# Patient Record
Sex: MALE | Race: WHITE | NOT HISPANIC OR LATINO | URBAN - METROPOLITAN AREA
[De-identification: names, ages, dates, MRNs, and addresses within clinical notes are randomized per-mention and may not be internally consistent; named-entity substitution may affect disease eponyms.]

---

## 2022-07-15 ENCOUNTER — OUTPATIENT (OUTPATIENT)
Dept: OUTPATIENT SERVICES | Facility: HOSPITAL | Age: 62
LOS: 1 days | End: 2022-07-15
Payer: MEDICARE

## 2022-07-15 DIAGNOSIS — Z01.818 ENCOUNTER FOR OTHER PREPROCEDURAL EXAMINATION: ICD-10-CM

## 2022-07-15 LAB
A1C WITH ESTIMATED AVERAGE GLUCOSE RESULT: 6.6 % — HIGH (ref 4–5.6)
ANION GAP SERPL CALC-SCNC: 15 MMOL/L — SIGNIFICANT CHANGE UP (ref 5–17)
APTT BLD: 27.4 SEC — LOW (ref 27.5–35.5)
BASOPHILS # BLD AUTO: 0.05 K/UL — SIGNIFICANT CHANGE UP (ref 0–0.2)
BASOPHILS NFR BLD AUTO: 0.5 % — SIGNIFICANT CHANGE UP (ref 0–2)
BLD GP AB SCN SERPL QL: SIGNIFICANT CHANGE UP
BUN SERPL-MCNC: 11.8 MG/DL — SIGNIFICANT CHANGE UP (ref 8–20)
CALCIUM SERPL-MCNC: 9.6 MG/DL — SIGNIFICANT CHANGE UP (ref 8.6–10.2)
CHLORIDE SERPL-SCNC: 99 MMOL/L — SIGNIFICANT CHANGE UP (ref 98–107)
CO2 SERPL-SCNC: 30 MMOL/L — HIGH (ref 22–29)
CREAT SERPL-MCNC: 0.97 MG/DL — SIGNIFICANT CHANGE UP (ref 0.5–1.3)
EGFR: 88 ML/MIN/1.73M2 — SIGNIFICANT CHANGE UP
EOSINOPHIL # BLD AUTO: 0.13 K/UL — SIGNIFICANT CHANGE UP (ref 0–0.5)
EOSINOPHIL NFR BLD AUTO: 1.3 % — SIGNIFICANT CHANGE UP (ref 0–6)
ESTIMATED AVERAGE GLUCOSE: 143 MG/DL — HIGH (ref 68–114)
GLUCOSE SERPL-MCNC: 153 MG/DL — HIGH (ref 70–99)
HCT VFR BLD CALC: 42.1 % — SIGNIFICANT CHANGE UP (ref 39–50)
HGB BLD-MCNC: 13.5 G/DL — SIGNIFICANT CHANGE UP (ref 13–17)
IMM GRANULOCYTES NFR BLD AUTO: 0.4 % — SIGNIFICANT CHANGE UP (ref 0–1.5)
INR BLD: 1.03 RATIO — SIGNIFICANT CHANGE UP (ref 0.88–1.16)
LYMPHOCYTES # BLD AUTO: 2.24 K/UL — SIGNIFICANT CHANGE UP (ref 1–3.3)
LYMPHOCYTES # BLD AUTO: 22.9 % — SIGNIFICANT CHANGE UP (ref 13–44)
MCHC RBC-ENTMCNC: 26.3 PG — LOW (ref 27–34)
MCHC RBC-ENTMCNC: 32.1 GM/DL — SIGNIFICANT CHANGE UP (ref 32–36)
MCV RBC AUTO: 82.1 FL — SIGNIFICANT CHANGE UP (ref 80–100)
MONOCYTES # BLD AUTO: 0.73 K/UL — SIGNIFICANT CHANGE UP (ref 0–0.9)
MONOCYTES NFR BLD AUTO: 7.4 % — SIGNIFICANT CHANGE UP (ref 2–14)
NEUTROPHILS # BLD AUTO: 6.61 K/UL — SIGNIFICANT CHANGE UP (ref 1.8–7.4)
NEUTROPHILS NFR BLD AUTO: 67.5 % — SIGNIFICANT CHANGE UP (ref 43–77)
PLATELET # BLD AUTO: 299 K/UL — SIGNIFICANT CHANGE UP (ref 150–400)
POTASSIUM SERPL-MCNC: 4.2 MMOL/L — SIGNIFICANT CHANGE UP (ref 3.5–5.3)
POTASSIUM SERPL-SCNC: 4.2 MMOL/L — SIGNIFICANT CHANGE UP (ref 3.5–5.3)
PROTHROM AB SERPL-ACNC: 11.9 SEC — SIGNIFICANT CHANGE UP (ref 10.5–13.4)
RBC # BLD: 5.13 M/UL — SIGNIFICANT CHANGE UP (ref 4.2–5.8)
RBC # FLD: 14.9 % — HIGH (ref 10.3–14.5)
SODIUM SERPL-SCNC: 143 MMOL/L — SIGNIFICANT CHANGE UP (ref 135–145)
WBC # BLD: 9.8 K/UL — SIGNIFICANT CHANGE UP (ref 3.8–10.5)
WBC # FLD AUTO: 9.8 K/UL — SIGNIFICANT CHANGE UP (ref 3.8–10.5)

## 2022-07-15 PROCEDURE — 93005 ELECTROCARDIOGRAM TRACING: CPT

## 2022-07-15 PROCEDURE — 93010 ELECTROCARDIOGRAM REPORT: CPT

## 2022-07-15 PROCEDURE — G0463: CPT

## 2022-10-09 ENCOUNTER — INPATIENT (INPATIENT)
Facility: HOSPITAL | Age: 62
LOS: 1 days | Discharge: HOME | DRG: 552 | End: 2022-10-11
Attending: NEUROLOGICAL SURGERY | Admitting: NEUROLOGICAL SURGERY
Payer: MEDICARE

## 2022-10-09 VITALS
HEART RATE: 62 BPM | WEIGHT: 287.92 LBS | HEIGHT: 70 IN | TEMPERATURE: 98 F | DIASTOLIC BLOOD PRESSURE: 73 MMHG | SYSTOLIC BLOOD PRESSURE: 110 MMHG | RESPIRATION RATE: 18 BRPM | OXYGEN SATURATION: 96 %

## 2022-10-09 DIAGNOSIS — M54.9 DORSALGIA, UNSPECIFIED: ICD-10-CM

## 2022-10-09 DIAGNOSIS — Z98.890 OTHER SPECIFIED POSTPROCEDURAL STATES: Chronic | ICD-10-CM

## 2022-10-09 LAB
ALBUMIN SERPL ELPH-MCNC: 4 G/DL — SIGNIFICANT CHANGE UP (ref 3.3–5)
ALP SERPL-CCNC: 78 U/L — SIGNIFICANT CHANGE UP (ref 40–120)
ALT FLD-CCNC: 20 U/L — SIGNIFICANT CHANGE UP (ref 10–45)
ANION GAP SERPL CALC-SCNC: 13 MMOL/L — SIGNIFICANT CHANGE UP (ref 5–17)
APTT BLD: 29.3 SEC — SIGNIFICANT CHANGE UP (ref 27.5–35.5)
AST SERPL-CCNC: 18 U/L — SIGNIFICANT CHANGE UP (ref 10–40)
BASOPHILS # BLD AUTO: 0.04 K/UL — SIGNIFICANT CHANGE UP (ref 0–0.2)
BASOPHILS NFR BLD AUTO: 0.4 % — SIGNIFICANT CHANGE UP (ref 0–2)
BILIRUB SERPL-MCNC: 0.3 MG/DL — SIGNIFICANT CHANGE UP (ref 0.2–1.2)
BLD GP AB SCN SERPL QL: NEGATIVE — SIGNIFICANT CHANGE UP
BUN SERPL-MCNC: 11 MG/DL — SIGNIFICANT CHANGE UP (ref 7–23)
CALCIUM SERPL-MCNC: 9.4 MG/DL — SIGNIFICANT CHANGE UP (ref 8.4–10.5)
CHLORIDE SERPL-SCNC: 96 MMOL/L — SIGNIFICANT CHANGE UP (ref 96–108)
CO2 SERPL-SCNC: 29 MMOL/L — SIGNIFICANT CHANGE UP (ref 22–31)
CREAT SERPL-MCNC: 0.79 MG/DL — SIGNIFICANT CHANGE UP (ref 0.5–1.3)
EGFR: 100 ML/MIN/1.73M2 — SIGNIFICANT CHANGE UP
EOSINOPHIL # BLD AUTO: 0.36 K/UL — SIGNIFICANT CHANGE UP (ref 0–0.5)
EOSINOPHIL NFR BLD AUTO: 4 % — SIGNIFICANT CHANGE UP (ref 0–6)
ERYTHROCYTE [SEDIMENTATION RATE] IN BLOOD: 73 MM/HR — HIGH (ref 0–20)
GLUCOSE SERPL-MCNC: 96 MG/DL — SIGNIFICANT CHANGE UP (ref 70–99)
HCT VFR BLD CALC: 40.3 % — SIGNIFICANT CHANGE UP (ref 39–50)
HGB BLD-MCNC: 12.6 G/DL — LOW (ref 13–17)
IMM GRANULOCYTES NFR BLD AUTO: 0.2 % — SIGNIFICANT CHANGE UP (ref 0–0.9)
INR BLD: 1.05 RATIO — SIGNIFICANT CHANGE UP (ref 0.88–1.16)
LYMPHOCYTES # BLD AUTO: 2.63 K/UL — SIGNIFICANT CHANGE UP (ref 1–3.3)
LYMPHOCYTES # BLD AUTO: 29.2 % — SIGNIFICANT CHANGE UP (ref 13–44)
MCHC RBC-ENTMCNC: 26.4 PG — LOW (ref 27–34)
MCHC RBC-ENTMCNC: 31.3 GM/DL — LOW (ref 32–36)
MCV RBC AUTO: 84.3 FL — SIGNIFICANT CHANGE UP (ref 80–100)
MONOCYTES # BLD AUTO: 0.66 K/UL — SIGNIFICANT CHANGE UP (ref 0–0.9)
MONOCYTES NFR BLD AUTO: 7.3 % — SIGNIFICANT CHANGE UP (ref 2–14)
NEUTROPHILS # BLD AUTO: 5.3 K/UL — SIGNIFICANT CHANGE UP (ref 1.8–7.4)
NEUTROPHILS NFR BLD AUTO: 58.9 % — SIGNIFICANT CHANGE UP (ref 43–77)
NRBC # BLD: 0 /100 WBCS — SIGNIFICANT CHANGE UP (ref 0–0)
PLATELET # BLD AUTO: 320 K/UL — SIGNIFICANT CHANGE UP (ref 150–400)
POTASSIUM SERPL-MCNC: 3.5 MMOL/L — SIGNIFICANT CHANGE UP (ref 3.5–5.3)
POTASSIUM SERPL-SCNC: 3.5 MMOL/L — SIGNIFICANT CHANGE UP (ref 3.5–5.3)
PROT SERPL-MCNC: 7.3 G/DL — SIGNIFICANT CHANGE UP (ref 6–8.3)
PROTHROM AB SERPL-ACNC: 12.2 SEC — SIGNIFICANT CHANGE UP (ref 10.5–13.4)
RBC # BLD: 4.78 M/UL — SIGNIFICANT CHANGE UP (ref 4.2–5.8)
RBC # FLD: 15.7 % — HIGH (ref 10.3–14.5)
RH IG SCN BLD-IMP: NEGATIVE — SIGNIFICANT CHANGE UP
SARS-COV-2 RNA SPEC QL NAA+PROBE: SIGNIFICANT CHANGE UP
SODIUM SERPL-SCNC: 138 MMOL/L — SIGNIFICANT CHANGE UP (ref 135–145)
WBC # BLD: 9.01 K/UL — SIGNIFICANT CHANGE UP (ref 3.8–10.5)
WBC # FLD AUTO: 9.01 K/UL — SIGNIFICANT CHANGE UP (ref 3.8–10.5)

## 2022-10-09 PROCEDURE — 93971 EXTREMITY STUDY: CPT | Mod: 26,RT

## 2022-10-09 PROCEDURE — 99285 EMERGENCY DEPT VISIT HI MDM: CPT

## 2022-10-09 RX ORDER — AMLODIPINE BESYLATE 2.5 MG/1
10 TABLET ORAL DAILY
Refills: 0 | Status: DISCONTINUED | OUTPATIENT
Start: 2022-10-09 | End: 2022-10-11

## 2022-10-09 RX ORDER — OXYCODONE HYDROCHLORIDE 5 MG/1
10 TABLET ORAL EVERY 4 HOURS
Refills: 0 | Status: DISCONTINUED | OUTPATIENT
Start: 2022-10-09 | End: 2022-10-11

## 2022-10-09 RX ORDER — GABAPENTIN 400 MG/1
300 CAPSULE ORAL THREE TIMES A DAY
Refills: 0 | Status: DISCONTINUED | OUTPATIENT
Start: 2022-10-09 | End: 2022-10-11

## 2022-10-09 RX ORDER — ALPRAZOLAM 0.25 MG
1 TABLET ORAL
Refills: 0 | Status: DISCONTINUED | OUTPATIENT
Start: 2022-10-09 | End: 2022-10-11

## 2022-10-09 RX ORDER — METOPROLOL TARTRATE 50 MG
100 TABLET ORAL DAILY
Refills: 0 | Status: DISCONTINUED | OUTPATIENT
Start: 2022-10-09 | End: 2022-10-11

## 2022-10-09 RX ORDER — ONDANSETRON 8 MG/1
4 TABLET, FILM COATED ORAL EVERY 6 HOURS
Refills: 0 | Status: DISCONTINUED | OUTPATIENT
Start: 2022-10-09 | End: 2022-10-11

## 2022-10-09 RX ORDER — OXYCODONE HYDROCHLORIDE 5 MG/1
5 TABLET ORAL EVERY 4 HOURS
Refills: 0 | Status: DISCONTINUED | OUTPATIENT
Start: 2022-10-09 | End: 2022-10-11

## 2022-10-09 RX ORDER — ATORVASTATIN CALCIUM 80 MG/1
40 TABLET, FILM COATED ORAL AT BEDTIME
Refills: 0 | Status: DISCONTINUED | OUTPATIENT
Start: 2022-10-09 | End: 2022-10-11

## 2022-10-09 RX ORDER — ACETAMINOPHEN 500 MG
650 TABLET ORAL EVERY 6 HOURS
Refills: 0 | Status: DISCONTINUED | OUTPATIENT
Start: 2022-10-09 | End: 2022-10-11

## 2022-10-09 RX ORDER — FOLIC ACID 0.8 MG
1 TABLET ORAL DAILY
Refills: 0 | Status: DISCONTINUED | OUTPATIENT
Start: 2022-10-09 | End: 2022-10-11

## 2022-10-09 RX ORDER — SENNA PLUS 8.6 MG/1
2 TABLET ORAL AT BEDTIME
Refills: 0 | Status: DISCONTINUED | OUTPATIENT
Start: 2022-10-09 | End: 2022-10-11

## 2022-10-09 RX ORDER — PANTOPRAZOLE SODIUM 20 MG/1
40 TABLET, DELAYED RELEASE ORAL
Refills: 0 | Status: DISCONTINUED | OUTPATIENT
Start: 2022-10-09 | End: 2022-10-11

## 2022-10-09 RX ORDER — POLYETHYLENE GLYCOL 3350 17 G/17G
17 POWDER, FOR SOLUTION ORAL DAILY
Refills: 0 | Status: DISCONTINUED | OUTPATIENT
Start: 2022-10-09 | End: 2022-10-11

## 2022-10-09 RX ORDER — METHOCARBAMOL 500 MG/1
500 TABLET, FILM COATED ORAL THREE TIMES A DAY
Refills: 0 | Status: DISCONTINUED | OUTPATIENT
Start: 2022-10-09 | End: 2022-10-11

## 2022-10-09 RX ORDER — OXYCODONE HYDROCHLORIDE 5 MG/1
5 TABLET ORAL ONCE
Refills: 0 | Status: DISCONTINUED | OUTPATIENT
Start: 2022-10-09 | End: 2022-10-09

## 2022-10-09 RX ORDER — VALSARTAN 80 MG/1
320 TABLET ORAL DAILY
Refills: 0 | Status: DISCONTINUED | OUTPATIENT
Start: 2022-10-09 | End: 2022-10-11

## 2022-10-09 RX ORDER — ENOXAPARIN SODIUM 100 MG/ML
30 INJECTION SUBCUTANEOUS EVERY 12 HOURS
Refills: 0 | Status: DISCONTINUED | OUTPATIENT
Start: 2022-10-09 | End: 2022-10-11

## 2022-10-09 RX ADMIN — OXYCODONE HYDROCHLORIDE 5 MILLIGRAM(S): 5 TABLET ORAL at 15:35

## 2022-10-09 RX ADMIN — Medication 1 MILLIGRAM(S): at 21:57

## 2022-10-09 RX ADMIN — SENNA PLUS 2 TABLET(S): 8.6 TABLET ORAL at 21:58

## 2022-10-09 RX ADMIN — OXYCODONE HYDROCHLORIDE 10 MILLIGRAM(S): 5 TABLET ORAL at 20:00

## 2022-10-09 RX ADMIN — ENOXAPARIN SODIUM 30 MILLIGRAM(S): 100 INJECTION SUBCUTANEOUS at 18:28

## 2022-10-09 RX ADMIN — ATORVASTATIN CALCIUM 40 MILLIGRAM(S): 80 TABLET, FILM COATED ORAL at 21:57

## 2022-10-09 RX ADMIN — OXYCODONE HYDROCHLORIDE 10 MILLIGRAM(S): 5 TABLET ORAL at 21:00

## 2022-10-09 NOTE — ED PROVIDER NOTE - CLINICAL SUMMARY MEDICAL DECISION MAKING FREE TEXT BOX
61 y/o M, PMH of HTN, HLD and RA, presents to ED for chronic radicular back pain worsening over the last 3 weeks. Pt unable to do outpt MRI. Was sent in by Dr. Odell for MRI of lumbar spine under anesthesia. Pt endorses some chills x 1 week. But denies any trauma/falls, saddle anesthesia, urinary/fecal incontinence or retention, weakness/numbness, or fevers.  Pt's neuro exam is intact. RLE w/ some slight edema  Plan to check basic labs, pre-ops, ESR/CRP, Duplex US of RLE, and MR of lumbar spine. Will consult NSGY, and await recs.

## 2022-10-09 NOTE — ED ADULT TRIAGE NOTE - CHIEF COMPLAINT QUOTE
back pain radiating to right groin & leg x 3 weeks, seen in multiple ERs and d/c for same problem, denies falls, saw pain management doctor outpatient. denies urinary/ stool incontinence

## 2022-10-09 NOTE — H&P ADULT - NSHPLABSRESULTS_GEN_ALL_CORE
CT L spine from OSH: vaccum disc L4/5 and L5/S1 with b/l osteophytes causing foraminal narrowing at same levels, additional L4/5 posterior disc osteophyte with mild canal stenosis

## 2022-10-09 NOTE — H&P ADULT - HISTORY OF PRESENT ILLNESS
Sohail Castro 62M PMH HLD HTN obese RA (on immunosuppressants) p/f worsening LBP since MILD procedure July 29th by [place] pain doc. Pain radiates to R L1/2, L4. Denies weakness/saddle anesthesia/incontinence.

## 2022-10-09 NOTE — ED ADULT NURSE NOTE - OBJECTIVE STATEMENT
63 yo male with a pmh of HTN, HLD and RA who presents with 3 weeks of severe back pain. The pain starts in the lower back and radiates down his right leg to his groin and knee. He has difficulty walking due to pain. Patient has multiple previous ER and outpatient pain management visits and was prescribed lidocaine patches, methocarbamol, methylprednisolone and oxycodone all of which provided minimal relief. Neurosurgeon Dr. Odell told patient to come to ED to receive Lumbar MRI with sedation. Patient tried receiving MRIs outpatient but was unable to lay down due to pain. Patient has a percutaneous decompression of the spine in July 2022 by Dr. Solis in [place] which provided relief for a month. No genitourinary symptoms or saddle anesthesia. 61 yo male with a pmh of HTN, HLD and RA who presents with 3 weeks of severe back pain. The pain starts in the lower back and radiates down his right leg to his groin and knee. He has difficulty walking due to pain. Patient has multiple previous ER and outpatient pain management visits and was prescribed lidocaine patches, methocarbamol, methylprednisolone and oxycodone all of which provided minimal relief. Neurosurgeon Dr. Odell told patient to come to ED to receive Lumbar MRI with sedation. Patient tried receiving MRIs outpatient but was unable to lay down due to pain. Patient has a percutaneous decompression of the spine in July 2022 by Dr. Solis in [place] which provided relief for a month. No genitourinary symptoms or saddle anesthesia. 63 yo male with a pmh of HTN, HLD and RA who presents with 3 weeks of severe back pain. The pain starts in the lower back and radiates down his right leg to his groin and knee. He has difficulty walking due to pain. Patient has multiple previous ER and outpatient pain management visits and was prescribed lidocaine patches, methocarbamol, methylprednisolone and oxycodone all of which provided minimal relief. Neurosurgeon Dr. Odell told patient to come to ED to receive Lumbar MRI with sedation. Patient tried receiving MRIs outpatient but was unable to lay down due to pain. Patient has a percutaneous decompression of the spine in July 2022 by Dr. Solis in [place] which provided relief for a month. No genitourinary symptoms or saddle anesthesia.

## 2022-10-09 NOTE — H&P ADULT - NSHPROSALLOTHERNEGRD_GEN_ALL_CORE
CT scans completed. Assessed by consulting teams. Right radial a-line placed at bedside by Dr. Garcia. Arreola placed and immediately drained 875mL. MD aware. Temp 100.3-zosyn given. All other review of systems negative, except as noted in HPI

## 2022-10-09 NOTE — ED PROVIDER NOTE - OBJECTIVE STATEMENT
61 yo male with a pmh of HTN, HLD and RA who presents with 3 weeks of severe back pain. The pain starts in the lower back and radiates down his right leg to his groin and knee. He has difficulty walking due to pain. Patient has multiple previous ER and outpatient pain management visits and was prescribed lidocaine patches, methocarbamol, methylprednisolone and oxycodone all of which provided minimal relief. Neurosurgeon Dr. Odell told patient to come to ED to receive Lumbar MRI with sedation. Patient tried receiving MRIs outpatient but was unable to lay down due to pain. Patient has a percutaneous decompression of the spine in July 2022 by Dr. Solis in [place] which provided relief for a month. No genitourinary symptoms or saddle anesthesia. 61 yo male with a pmh of HTN, HLD and RA who presents with 3 weeks of severe back pain. The pain starts in the lower back and radiates down his right leg to his groin and knee. He has difficulty walking due to pain. Patient has multiple previous ER and outpatient pain management visits and was prescribed lidocaine patches, methocarbamol, methylprednisolone and oxycodone all of which provided minimal relief. Neurosurgeon Dr. Odell told patient to come to ED to receive Lumbar MRI with sedation. Patient tried receiving MRIs outpatient but was unable to lay down due to pain. Patient has a percutaneous decompression of the spine in July 2022 by Dr. Solis in [place] which provided relief for a month. No genitourinary symptoms or saddle anesthesia. 61 yo male with a pmh of HTN, HLD and RA who presents with 3 weeks of severe back pain. The pain starts in the lower back and radiates down his right leg to his groin and knee. He has difficulty walking due to pain. Patient has multiple previous ER and outpatient pain management visits and was prescribed lidocaine patches, methocarbamol, methylprednisolone and oxycodone all of which provided minimal relief. Neurosurgeon Dr. Odell told patient to come to ED to receive Lumbar MRI with sedation. Patient tried receiving MRIs outpatient but was unable to lay down due to pain. Patient has a percutaneous decompression of the spine in July 2022 by Dr. Solis in [place] which provided relief for a month. No genitourinary symptoms or saddle anesthesia.

## 2022-10-09 NOTE — H&P ADULT - ASSESSMENT
Sohail Castro 62M PMH HLD HTN obese RA (on immunosuppressants) p/f worsening LBP since MILD procedure July 29th by [place] pain doc. Pain radiates to R L1/2, L4. Denies weakness/saddle anesthesia/incontinence. CT L spine from OSH: vaccum disc L4/5 and L5/S1 with b/l osteophytes causing foraminal narrowing at same levels, additional L4/5 posterior disc osteophyte with mild canal stenosis  Exam: AOx3, BUE 5/5 BLE 5/5 neg hoffmans/clonus, R patellar 3+   -MR L spine with anesthesia  -Robaxin, gabapentin, oxy, tyl for pain

## 2022-10-10 LAB
ALBUMIN SERPL ELPH-MCNC: 3.9 G/DL — SIGNIFICANT CHANGE UP (ref 3.3–5)
ALP SERPL-CCNC: 74 U/L — SIGNIFICANT CHANGE UP (ref 40–120)
ALT FLD-CCNC: 21 U/L — SIGNIFICANT CHANGE UP (ref 10–45)
ANION GAP SERPL CALC-SCNC: 11 MMOL/L — SIGNIFICANT CHANGE UP (ref 5–17)
AST SERPL-CCNC: 17 U/L — SIGNIFICANT CHANGE UP (ref 10–40)
BILIRUB SERPL-MCNC: 0.3 MG/DL — SIGNIFICANT CHANGE UP (ref 0.2–1.2)
BUN SERPL-MCNC: 10 MG/DL — SIGNIFICANT CHANGE UP (ref 7–23)
CALCIUM SERPL-MCNC: 9.5 MG/DL — SIGNIFICANT CHANGE UP (ref 8.4–10.5)
CHLORIDE SERPL-SCNC: 98 MMOL/L — SIGNIFICANT CHANGE UP (ref 96–108)
CO2 SERPL-SCNC: 33 MMOL/L — HIGH (ref 22–31)
CREAT SERPL-MCNC: 0.82 MG/DL — SIGNIFICANT CHANGE UP (ref 0.5–1.3)
EGFR: 99 ML/MIN/1.73M2 — SIGNIFICANT CHANGE UP
GLUCOSE SERPL-MCNC: 104 MG/DL — HIGH (ref 70–99)
HCT VFR BLD CALC: 38.6 % — LOW (ref 39–50)
HCV AB S/CO SERPL IA: 0.14 S/CO — SIGNIFICANT CHANGE UP (ref 0–0.99)
HCV AB SERPL-IMP: SIGNIFICANT CHANGE UP
HGB BLD-MCNC: 12 G/DL — LOW (ref 13–17)
MCHC RBC-ENTMCNC: 26.7 PG — LOW (ref 27–34)
MCHC RBC-ENTMCNC: 31.1 GM/DL — LOW (ref 32–36)
MCV RBC AUTO: 85.8 FL — SIGNIFICANT CHANGE UP (ref 80–100)
NRBC # BLD: 0 /100 WBCS — SIGNIFICANT CHANGE UP (ref 0–0)
PLATELET # BLD AUTO: 283 K/UL — SIGNIFICANT CHANGE UP (ref 150–400)
POTASSIUM SERPL-MCNC: 4 MMOL/L — SIGNIFICANT CHANGE UP (ref 3.5–5.3)
POTASSIUM SERPL-SCNC: 4 MMOL/L — SIGNIFICANT CHANGE UP (ref 3.5–5.3)
PROT SERPL-MCNC: 6.8 G/DL — SIGNIFICANT CHANGE UP (ref 6–8.3)
RBC # BLD: 4.5 M/UL — SIGNIFICANT CHANGE UP (ref 4.2–5.8)
RBC # FLD: 15.6 % — HIGH (ref 10.3–14.5)
SODIUM SERPL-SCNC: 142 MMOL/L — SIGNIFICANT CHANGE UP (ref 135–145)
WBC # BLD: 6.69 K/UL — SIGNIFICANT CHANGE UP (ref 3.8–10.5)
WBC # FLD AUTO: 6.69 K/UL — SIGNIFICANT CHANGE UP (ref 3.8–10.5)

## 2022-10-10 PROCEDURE — 93970 EXTREMITY STUDY: CPT | Mod: 26

## 2022-10-10 PROCEDURE — 73564 X-RAY EXAM KNEE 4 OR MORE: CPT | Mod: 26,RT

## 2022-10-10 PROCEDURE — 99232 SBSQ HOSP IP/OBS MODERATE 35: CPT | Mod: FS

## 2022-10-10 RX ORDER — SODIUM CHLORIDE 9 MG/ML
1000 INJECTION INTRAMUSCULAR; INTRAVENOUS; SUBCUTANEOUS
Refills: 0 | Status: DISCONTINUED | OUTPATIENT
Start: 2022-10-10 | End: 2022-10-11

## 2022-10-10 RX ADMIN — OXYCODONE HYDROCHLORIDE 10 MILLIGRAM(S): 5 TABLET ORAL at 23:12

## 2022-10-10 RX ADMIN — AMLODIPINE BESYLATE 10 MILLIGRAM(S): 2.5 TABLET ORAL at 06:01

## 2022-10-10 RX ADMIN — Medication 20 MILLIGRAM(S): at 11:53

## 2022-10-10 RX ADMIN — ATORVASTATIN CALCIUM 40 MILLIGRAM(S): 80 TABLET, FILM COATED ORAL at 23:11

## 2022-10-10 RX ADMIN — VALSARTAN 320 MILLIGRAM(S): 80 TABLET ORAL at 06:01

## 2022-10-10 RX ADMIN — OXYCODONE HYDROCHLORIDE 10 MILLIGRAM(S): 5 TABLET ORAL at 23:45

## 2022-10-10 RX ADMIN — Medication 1 MILLIGRAM(S): at 11:53

## 2022-10-10 RX ADMIN — PANTOPRAZOLE SODIUM 40 MILLIGRAM(S): 20 TABLET, DELAYED RELEASE ORAL at 06:02

## 2022-10-10 RX ADMIN — OXYCODONE HYDROCHLORIDE 10 MILLIGRAM(S): 5 TABLET ORAL at 16:23

## 2022-10-10 RX ADMIN — SENNA PLUS 2 TABLET(S): 8.6 TABLET ORAL at 23:11

## 2022-10-10 RX ADMIN — ENOXAPARIN SODIUM 30 MILLIGRAM(S): 100 INJECTION SUBCUTANEOUS at 06:01

## 2022-10-10 RX ADMIN — ENOXAPARIN SODIUM 30 MILLIGRAM(S): 100 INJECTION SUBCUTANEOUS at 19:21

## 2022-10-10 RX ADMIN — Medication 100 MILLIGRAM(S): at 10:56

## 2022-10-10 NOTE — CHART NOTE - NSCHARTNOTEFT_GEN_A_CORE

## 2022-10-10 NOTE — PHYSICAL THERAPY INITIAL EVALUATION ADULT - ADDITIONAL COMMENTS
Pt lives in a private house with spouse with 2 steps to enter and first floor set up.  Pt was Ind with all ADLs and amb without AD.

## 2022-10-10 NOTE — PHYSICAL THERAPY INITIAL EVALUATION ADULT - PERTINENT HX OF CURRENT PROBLEM, REHAB EVAL
Pt is a  63 yo male admitted to [facility] on 10/9/22 PMH HLD HTN obese RA (on immunosuppressants) p/f worsening LBP since MILD procedure July 29th by [place] pain doc. Pain radiates to R L1/2, L4. Denies weakness/saddle anesthesia/incontinence. (-) VA duplex BLE . Pt is a  61 yo male admitted to [facility] on 10/9/22 PMH HLD HTN obese RA (on immunosuppressants) p/f worsening LBP since MILD procedure July 29th by [place] pain doc. Pain radiates to R L1/2, L4. Denies weakness/saddle anesthesia/incontinence. (-) VA duplex BLE . Pt is a  61 yo male admitted to [facility] on 10/9/22 PMH HLD HTN obese RA (on immunosuppressants) p/f worsening LBP since MILD procedure July 29th by [place] pain doc. Pain radiates to R L1/2, L4. Denies weakness/saddle anesthesia/incontinence. (-) VA duplex BLE .

## 2022-10-10 NOTE — PROGRESS NOTE ADULT - ASSESSMENT
HPI:  Patient is a 62 year old male with low back pain and right lower extremity radiculopathy.  Admitted to the neurosurgery service for further management.    PLAN:  Neuro:   -q4 hour neuro checks  -oxycodone for pain control  -gabapentin for neuropathic pain  -robaxin for muscle spasms  -xanax prn anxiety  -continue paxil  -out of bed with assistance  -pt eval pending    Respiratory:   -satting well on room air  -incentive spirometer for lung expansion    CV:  -keep sbp 100-140  -norvasc, valsartan, hctz, and metoprolol for bp control  -atorvastatin for lipid control    Endocrine:   -maintain euglycemia    Heme/Onc:    -lovenox and scds for dvt prophylaxis    Renal:   -voiding    ID:   -afebrile    GI:   -regular diet  -senna and miralax for bowel regimen  -protonix for gi prophylaxis      Spectra #[id number] HPI:  Patient is a 62 year old male with low back pain and right lower extremity radiculopathy.  Admitted to the neurosurgery service for further management.    PLAN:  Neuro:   -q4 hour neuro checks  -oxycodone for pain control  -gabapentin for neuropathic pain  -robaxin for muscle spasms  -xanax prn anxiety  -continue paxil  -out of bed with assistance  -pt eval pending    Respiratory:   -satting well on room air  -incentive spirometer for lung expansion    CV:  -keep sbp 100-140  -norvasc, valsartan, hctz, and metoprolol for bp control  -atorvastatin for lipid control    Endocrine:   -maintain euglycemia    Heme/Onc:    -lovenox and scds for dvt prophylaxis    Renal:   -voiding    ID:   -afebrile    GI:   -regular diet  -senna and miralax for bowel regimen  -protonix for gi prophylaxis      Spectra #[id number] HPI:  Patient is a 62 year old male with low back pain and right lower extremity radiculopathy.  Admitted to the neurosurgery service for further management.    PLAN:  Neuro:   -q4 hour neuro checks  -oxycodone for pain control  -gabapentin for neuropathic pain  -robaxin for muscle spasms  -xanax prn anxiety  -continue paxil  -out of bed with assistance  -pt eval pending    Respiratory:   -satting well on room air  -incentive spirometer for lung expansion    CV:  -keep sbp 100-140  -norvasc, valsartan, hctz, and metoprolol for bp control  -atorvastatin for lipid control    Endocrine:   -maintain euglycemia    Heme/Onc:    -lovenox and scds for dvt prophylaxis    Renal:   -voiding    ID:   -afebrile    GI:   -regular diet  -senna and miralax for bowel regimen  -protonix for gi prophylaxis      Spectra #[id number] HPI:  Patient is a 62 year old male with low back pain and right lower extremity radiculopathy.  Admitted to the neurosurgery service for further management.    PLAN:  Neuro:   -q4 hour neuro checks  -oxycodone for pain control  -gabapentin for neuropathic pain  -robaxin for muscle spasms  -mri lumbar spine w/ anesthesia - pending  -xanax prn anxiety  -continue paxil  -out of bed with assistance  -pt eval pending    Respiratory:   -satting well on room air  -incentive spirometer for lung expansion    CV:  -keep sbp 100-140  -norvasc, valsartan, hctz, and metoprolol for bp control  -atorvastatin for lipid control    Endocrine:   -maintain euglycemia    Heme/Onc:    -lovenox and scds for dvt prophylaxis  -lower extremity duplex to rule out dvt on admission - pending    Renal:   -voiding    ID:   -afebrile    GI:   -regular diet  -senna and miralax for bowel regimen  -protonix for gi prophylaxis      Spectra #[id number] HPI:  Patient is a 62 year old male with low back pain and right lower extremity radiculopathy.  Admitted to the neurosurgery service for further management.    PLAN:  Neuro:   -q4 hour neuro checks  -oxycodone for pain control  -gabapentin for neuropathic pain  -robaxin for muscle spasms  -mri lumbar spine w/ anesthesia - pending  -xanax prn anxiety  -continue paxil  -out of bed with assistance  -pt eval pending    Respiratory:   -satting well on room air  -incentive spirometer for lung expansion    CV:  -keep sbp 100-140  -norvasc, valsartan, hctz, and metoprolol for bp control  -atorvastatin for lipid control    Endocrine:   -maintain euglycemia    Heme/Onc:    -lovenox and scds for dvt prophylaxis  -lower extremity duplex to rule out dvt on admission - pending    Renal:   -voiding    ID:   -afebrile    GI:   -regular diet  -senna and miralax for bowel regimen  -protonix for gi prophylaxis      Spectra #[id number] HPI:  Patient is a 62 year old male with low back pain and right lower extremity radiculopathy.  Admitted to the neurosurgery service for further management.    PLAN:  Neuro:   -q4 hour neuro checks  -oxycodone for pain control  -gabapentin for neuropathic pain  -robaxin for muscle spasms  -mri lumbar spine w/ anesthesia - pending  -xanax prn anxiety  -continue paxil  -out of bed with assistance  -pt eval pending    Respiratory:   -satting well on room air  -incentive spirometer for lung expansion    CV:  -keep sbp 100-140  -norvasc, valsartan, hctz, and metoprolol for bp control  -atorvastatin for lipid control    Endocrine:   -maintain euglycemia    Heme/Onc:    -lovenox and scds for dvt prophylaxis  -lower extremity duplex to rule out dvt on admission - pending    Renal:   -voiding    ID:   -afebrile    GI:   -regular diet  -senna and miralax for bowel regimen  -protonix for gi prophylaxis      Spectra #[id number]

## 2022-10-10 NOTE — PHYSICAL THERAPY INITIAL EVALUATION ADULT - WEIGHT-BEARING RESTRICTIONS: SIT/STAND, REHAB EVAL
full weight-bearing
I will START or STAY ON the medications listed below when I get home from the hospital:    acetaminophen 325 mg oral tablet  -- 3 tab(s) by mouth every 6 hours  -- Indication: For Pain    ibuprofen 600 mg oral tablet  -- 1 tab(s) by mouth every 6 hours  -- Indication: For Pain    benzocaine 20% topical spray  -- 1 spray(s) on skin every 6 hours, As needed, for Perineal discomfort  -- Indication: For Perineal pain

## 2022-10-11 ENCOUNTER — TRANSCRIPTION ENCOUNTER (OUTPATIENT)
Age: 62
End: 2022-10-11

## 2022-10-11 VITALS
DIASTOLIC BLOOD PRESSURE: 72 MMHG | RESPIRATION RATE: 17 BRPM | OXYGEN SATURATION: 93 % | SYSTOLIC BLOOD PRESSURE: 117 MMHG | HEART RATE: 60 BPM | TEMPERATURE: 98 F

## 2022-10-11 PROCEDURE — 99223 1ST HOSP IP/OBS HIGH 75: CPT | Mod: FS

## 2022-10-11 PROCEDURE — 99233 SBSQ HOSP IP/OBS HIGH 50: CPT | Mod: FS

## 2022-10-11 RX ORDER — POLYETHYLENE GLYCOL 3350 17 G/17G
17 POWDER, FOR SOLUTION ORAL
Qty: 0 | Refills: 0 | DISCHARGE
Start: 2022-10-11

## 2022-10-11 RX ORDER — METHOCARBAMOL 500 MG/1
1 TABLET, FILM COATED ORAL
Qty: 0 | Refills: 0 | DISCHARGE
Start: 2022-10-11

## 2022-10-11 RX ORDER — OXYCODONE HYDROCHLORIDE 5 MG/1
1 TABLET ORAL
Qty: 0 | Refills: 0 | DISCHARGE
Start: 2022-10-11

## 2022-10-11 RX ORDER — INFLUENZA VIRUS VACCINE 15; 15; 15; 15 UG/.5ML; UG/.5ML; UG/.5ML; UG/.5ML
0.5 SUSPENSION INTRAMUSCULAR ONCE
Refills: 0 | Status: DISCONTINUED | OUTPATIENT
Start: 2022-10-11 | End: 2022-10-11

## 2022-10-11 RX ORDER — SENNA PLUS 8.6 MG/1
2 TABLET ORAL
Qty: 0 | Refills: 0 | DISCHARGE
Start: 2022-10-11

## 2022-10-11 RX ADMIN — OXYCODONE HYDROCHLORIDE 10 MILLIGRAM(S): 5 TABLET ORAL at 13:35

## 2022-10-11 RX ADMIN — ENOXAPARIN SODIUM 30 MILLIGRAM(S): 100 INJECTION SUBCUTANEOUS at 06:44

## 2022-10-11 RX ADMIN — PANTOPRAZOLE SODIUM 40 MILLIGRAM(S): 20 TABLET, DELAYED RELEASE ORAL at 06:43

## 2022-10-11 RX ADMIN — OXYCODONE HYDROCHLORIDE 10 MILLIGRAM(S): 5 TABLET ORAL at 14:05

## 2022-10-11 RX ADMIN — Medication 20 MILLIGRAM(S): at 12:19

## 2022-10-11 RX ADMIN — AMLODIPINE BESYLATE 10 MILLIGRAM(S): 2.5 TABLET ORAL at 06:43

## 2022-10-11 RX ADMIN — Medication 100 MILLIGRAM(S): at 06:43

## 2022-10-11 RX ADMIN — VALSARTAN 320 MILLIGRAM(S): 80 TABLET ORAL at 06:43

## 2022-10-11 RX ADMIN — Medication 1 MILLIGRAM(S): at 12:19

## 2022-10-11 NOTE — DISCHARGE NOTE NURSING/CASE MANAGEMENT/SOCIAL WORK - PATIENT PORTAL LINK FT
You can access the FollowMyHealth Patient Portal offered by [facility] by registering at the following website: [email or web address]. By joining [employer]’s FollowMyHealth portal, you will also be able to view your health information using other applications (apps) compatible with our system. You can access the FollowMyHealth Patient Portal offered by [facility] by registering at the following website: [email or web address]. By joining [employer]’s FollowMyHealth portal, you will also be able to view your health information using other applications (apps) compatible with our system. You can access the FollowMyHealth Patient Portal offered by [facility] by registering at the following website: [email or web address]. By joining [employer]’s FollowMyHealth portal, you will also be able to view your health information using other applications (apps) compatible with our system.

## 2022-10-11 NOTE — DISCHARGE NOTE PROVIDER - NSDCMRMEDTOKEN_GEN_ALL_CORE_FT
amlodipine-valsartan 10 mg-320 mg oral tablet: 1 tab(s) orally once a day  folic acid 1 mg oral tablet: 1 tab(s) orally once a day  hydroCHLOROthiazide 25 mg oral tablet: 1 tab(s) orally once a day  lansoprazole 30 mg oral delayed release capsule: 1 cap(s) orally once a day  metoprolol succinate 100 mg oral tablet, extended release: 1 tab(s) orally once a day  PARoxetine 25 mg oral tablet, extended release: 1 tab(s) orally once a day (in the morning)  rosuvastatin 10 mg oral tablet: 1 tab(s) orally once a day  Xanax 1 mg oral tablet: 1 tab(s) orally 2 times a day, As Needed   amlodipine-valsartan 10 mg-320 mg oral tablet: 1 tab(s) orally once a day  folic acid 1 mg oral tablet: 1 tab(s) orally once a day  hydroCHLOROthiazide 25 mg oral tablet: 1 tab(s) orally once a day  lansoprazole 30 mg oral delayed release capsule: 1 cap(s) orally once a day  metoprolol succinate 100 mg oral tablet, extended release: 1 tab(s) orally once a day  Outpatient Physical Therapy: use as directed    dx: low back pain    for any issues/questions, please call Dr. Odell:  phone #[phone]  fax #[phone]  PARoxetine 25 mg oral tablet, extended release: 1 tab(s) orally once a day (in the morning)  rosuvastatin 10 mg oral tablet: 1 tab(s) orally once a day  Xanax 1 mg oral tablet: 1 tab(s) orally 2 times a day, As Needed   amlodipine-valsartan 10 mg-320 mg oral tablet: 1 tab(s) orally once a day  folic acid 1 mg oral tablet: 1 tab(s) orally once a day  hydroCHLOROthiazide 25 mg oral tablet: 1 tab(s) orally once a day  lansoprazole 30 mg oral delayed release capsule: 1 cap(s) orally once a day  metoprolol succinate 100 mg oral tablet, extended release: 1 tab(s) orally once a day  Outpatient Physical Therapy: use as directed    dx: low back pain    for any issues/questions, please call Dr. Odell:  phone #[phone]  fax #[phone]  PARoxetine 25 mg oral tablet, extended release: 1 tab(s) orally once a day (in the morning)  rosuvastatin 10 mg oral tablet: 1 tab(s) orally once a day  Xanax 1 mg oral tablet: 1 tab(s) orally 2 times a day, As Needed   amlodipine-valsartan 10 mg-320 mg oral tablet: 1 tab(s) orally once a day  folic acid 1 mg oral tablet: 1 tab(s) orally once a day  hydroCHLOROthiazide 25 mg oral tablet: 1 tab(s) orally once a day  lansoprazole 30 mg oral delayed release capsule: 1 cap(s) orally once a day  metoprolol succinate 100 mg oral tablet, extended release: 1 tab(s) orally once a day  Outpatient Physical Therapy: use as directed    dx: low back pain    for any issues/questions, please call Dr. Odell:  phone #[phone]  fax #[phone]  PARoxetine 25 mg oral tablet, extended release: 1 tab(s) orally once a day (in the morning)  rosuvastatin 10 mg oral tablet: 1 tab(s) orally once a day  Xanax 1 mg oral tablet: 1 tab(s) orally 2 times a day, As Needed   amlodipine-valsartan 10 mg-320 mg oral tablet: 1 tab(s) orally once a day  folic acid 1 mg oral tablet: 1 tab(s) orally once a day  hydroCHLOROthiazide 25 mg oral tablet: 1 tab(s) orally once a day  lansoprazole 30 mg oral delayed release capsule: 1 cap(s) orally once a day  methocarbamol 500 mg oral tablet: 1 tab(s) orally 3 times a day, As needed, muscle spasms  metoprolol succinate 100 mg oral tablet, extended release: 1 tab(s) orally once a day  Outpatient Physical Therapy: use as directed    dx: low back pain    for any issues/questions, please call Dr. Odell:  phone #[phone]  fax #[phone]  oxyCODONE 10 mg oral tablet: 1 tab(s) orally every 6 hours, As Needed - 10)  PARoxetine 25 mg oral tablet, extended release: 1 tab(s) orally once a day (in the morning)  polyethylene glycol 3350 oral powder for reconstitution: 17 gram(s) orally once a day, As Needed, constipation  rosuvastatin 10 mg oral tablet: 1 tab(s) orally once a day  senna leaf extract oral tablet: 2 tab(s) orally once a day (at bedtime)  Xanax 1 mg oral tablet: 1 tab(s) orally 2 times a day, As Needed, anxiety   amlodipine-valsartan 10 mg-320 mg oral tablet: 1 tab(s) orally once a day  folic acid 1 mg oral tablet: 1 tab(s) orally once a day  hydroCHLOROthiazide 25 mg oral tablet: 1 tab(s) orally once a day  lansoprazole 30 mg oral delayed release capsule: 1 cap(s) orally once a day  methocarbamol 500 mg oral tablet: 1 tab(s) orally 3 times a day, As needed, muscle spasms  metoprolol succinate 100 mg oral tablet, extended release: 1 tab(s) orally once a day  Outpatient Physical Therapy: use as directed    dx: low back pain    for any issues/questions, please call Dr. Odell:  phone #[phone]  fax #[phone]  oxyCODONE 10 mg oral tablet: 1 tab(s) orally every 6 hours, As Needed - 10)  PARoxetine 25 mg oral tablet, extended release: 1 tab(s) orally once a day (in the morning)  polyethylene glycol 3350 oral powder for reconstitution: 17 gram(s) orally once a day, As Needed, constipation  rosuvastatin 10 mg oral tablet: 1 tab(s) orally once a day  senna leaf extract oral tablet: 2 tab(s) orally once a day (at bedtime)  Xanax 1 mg oral tablet: 1 tab(s) orally 2 times a day, As Needed, anxiety   amlodipine-valsartan 10 mg-320 mg oral tablet: 1 tab(s) orally once a day  folic acid 1 mg oral tablet: 1 tab(s) orally once a day  hydroCHLOROthiazide 25 mg oral tablet: 1 tab(s) orally once a day  lansoprazole 30 mg oral delayed release capsule: 1 cap(s) orally once a day  methocarbamol 500 mg oral tablet: 1 tab(s) orally 3 times a day, As needed, muscle spasms  metoprolol succinate 100 mg oral tablet, extended release: 1 tab(s) orally once a day  Outpatient Physical Therapy: use as directed    dx: low back pain    for any issues/questions, please call Dr. Odell:  phone #[phone]  fax #[phone]  oxyCODONE 10 mg oral tablet: 1 tab(s) orally every 6 hours, As Needed - 10)  PARoxetine 25 mg oral tablet, extended release: 1 tab(s) orally once a day (in the morning)  polyethylene glycol 3350 oral powder for reconstitution: 17 gram(s) orally once a day, As Needed, constipation  rosuvastatin 10 mg oral tablet: 1 tab(s) orally once a day  senna leaf extract oral tablet: 2 tab(s) orally once a day (at bedtime)  Xanax 1 mg oral tablet: 1 tab(s) orally 2 times a day, As Needed, anxiety

## 2022-10-11 NOTE — DISCHARGE NOTE PROVIDER - CARE PROVIDER_API CALL
Richie Odell (MD)  Neurosurgery  [address], [address]  [place] [address]  Phone: [phone]  Fax: [phone]  Follow Up Time: 1 week   Richie Odell (MD)  Neurosurgery  [address], [address]  [place] [address]  Phone: [phone]  Fax: [phone]  Follow Up Time: 1 week   Richie Odell (MD)  Neurosurgery  [address], [address]  [place] [address]  Phone: [phone]  Fax: [phone]  Follow Up Time: 1 week

## 2022-10-11 NOTE — PROGRESS NOTE ADULT - ASSESSMENT
HPI:  Patient is a 62 year old male with low back pain and right lower extremity radiculopathy.  Admitted to the neurosurgery service for further management.    PLAN:  Neuro:   -q4 hour neuro checks  -oxycodone for pain control  -gabapentin for neuropathic pain  -robaxin for muscle spasms  -mri lumbar spine w/ anesthesia - pending  -xanax prn anxiety  -continue paxil  -out of bed with assistance  -pt - final recs pending    Respiratory:   -satting well on room air  -incentive spirometer for lung expansion    CV:  -keep sbp 100-140  -norvasc, valsartan, hctz, and metoprolol for bp control  -atorvastatin for lipid control    Endocrine:   -maintain euglycemia    Heme/Onc:    -lovenox and scds for dvt prophylaxis  -lower extremity duplex to rule out dvt on admission -negative b/l    Renal:   -voiding    ID:   -afebrile    GI:   -npo/ivf, regular diet after mri  -senna and miralax for bowel regimen  -protonix for gi prophylaxis      Spectra #[phone]   HPI:  Patient is a 62 year old male with low back pain and right lower extremity radiculopathy.  Admitted to the neurosurgery service for further management.    PLAN:  Neuro:   -q4 hour neuro checks  -oxycodone for pain control  -gabapentin for neuropathic pain  -robaxin for muscle spasms  -mri lumbar spine w/ anesthesia - pending  -xanax prn anxiety  -continue paxil  -out of bed with assistance  -pt - final recs pending    Respiratory:   -satting well on room air  -incentive spirometer for lung expansion    CV:  -keep sbp 100-140  -norvasc, valsartan, hctz, and metoprolol for bp control  -atorvastatin for lipid control    Endocrine:   -maintain euglycemia    Heme/Onc:    -lovenox and scds for dvt prophylaxis  -lower extremity duplex to rule out dvt on admission -negative b/l    Renal:   -voiding    ID:   -afebrile    GI:   -npo/ivf, regular diet after mri  -senna and miralax for bowel regimen  -protonix for gi prophylaxis      Spectra #[id number]   HPI:  Patient is a 62 year old male with low back pain and right lower extremity radiculopathy.  Admitted to the neurosurgery service for further management.    PLAN:  Neuro:   -q4 hour neuro checks  -oxycodone for pain control  -gabapentin for neuropathic pain  -robaxin for muscle spasms  -mri lumbar spine w/ anesthesia - pending  -xanax prn anxiety  -continue paxil  -out of bed with assistance  -pt - final recs pending    Respiratory:   -satting well on room air  -incentive spirometer for lung expansion    CV:  -keep sbp 100-140  -norvasc, valsartan, hctz, and metoprolol for bp control  -atorvastatin for lipid control    Endocrine:   -maintain euglycemia    Heme/Onc:    -lovenox and scds for dvt prophylaxis  -lower extremity duplex to rule out dvt on admission -negative b/l    Renal:   -voiding    ID:   -afebrile    GI:   -npo/ivf, regular diet after mri  -senna and miralax for bowel regimen  -protonix for gi prophylaxis      Spectra #[phone]

## 2022-10-11 NOTE — DISCHARGE NOTE PROVIDER - NSDCCPCAREPLAN_GEN_ALL_CORE_FT
PRINCIPAL DISCHARGE DIAGNOSIS  Diagnosis: Back pain  Assessment and Plan of Treatment: No strenuous activity.  No lifting.  No twisting or bending.  Do not return to work until cleared to do so by physician.  No driving until cleared to do so by physician.  Do not take NSAIDs (aspirin, motrin, advil, aleve, etc) or blood thinners until cleared to do so by neurosurgeon  items for follow up: pending mri, ?further imaging  follow up with neurosurgery, Dr. Odell, within 1 week of discharge, call [phone]  follow up with PMD within 1 week of discharge       PRINCIPAL DISCHARGE DIAGNOSIS  Diagnosis: Back pain  Assessment and Plan of Treatment: No strenuous activity.  No lifting.  No twisting or bending.  Do not return to work until cleared to do so by physician.  No driving until cleared to do so by physician.  Do not take NSAIDs (aspirin, motrin, advil, aleve, etc) or blood thinners until cleared to do so by neurosurgeon  items for follow up: pending mri, ?further imaging  follow up with neurosurgery, Dr. Odell, within 1 week of discharge, call [phone]  follow up with PMD within 1 week of discharge       PRINCIPAL DISCHARGE DIAGNOSIS  Diagnosis: Back pain  Assessment and Plan of Treatment: No strenuous activity.  No lifting.  No twisting or bending.  Do not return to work until cleared to do so by physician.  No driving until cleared to do so by physician.  Do not take NSAIDs (aspirin, motrin, advil, aleve, etc) or blood thinners until cleared to do so by neurosurgeon  items for follow up: pending mri, ?further imaging  follow up with neurosurgery, Dr. Odell, within 1 week of discharge, call [phone]  follow up with PMD within 1 week of discharge       PRINCIPAL DISCHARGE DIAGNOSIS  Diagnosis: Back pain  Assessment and Plan of Treatment: No strenuous activity.  No lifting.  No twisting or bending.  Do not return to work until cleared to do so by physician.  No driving until cleared to do so by physician.  Do not take NSAIDs (aspirin, motrin, advil, aleve, etc) or blood thinners until cleared to do so by neurosurgeon  patient advised not to take oxycodone, xanax, or muscle relaxant at the same time as one of the others  items for follow up: pending mri, ?further imaging  follow up with neurosurgery, Dr. Odell, within 1 week of discharge, call [phone]  follow up with PMD within 1 week of discharge       PRINCIPAL DISCHARGE DIAGNOSIS  Diagnosis: Back pain  Assessment and Plan of Treatment: No strenuous activity.  No lifting.  No twisting or bending.  Do not return to work until cleared to do so by physician.  No driving until cleared to do so by physician.  Do not take NSAIDs (aspirin, motrin, advil, aleve, etc) or blood thinners until cleared to do so by neurosurgeon  patient advised not to take oxycodone, xanax, or muscle relaxant at the same time as one of the others  items for follow up: pending mri, ?further imaging  follow up with neurosurgery, Dr. Odell, within 1 week of discharge, call [phone]  follow up with PMD within 1 week of discharge       PRINCIPAL DISCHARGE DIAGNOSIS  Diagnosis: Back pain  Assessment and Plan of Treatment: No strenuous activity.  No lifting.  No twisting or bending.  Do not return to work until cleared to do so by physician.  No driving until cleared to do so by physician.  Do not take NSAIDs (aspirin, motrin, advil, aleve, etc) or blood thinners until cleared to do so by neurosurgeon  patient advised not to take oxycodone, xanax, or muscle relaxant at the same time as one of the others  items for follow up: pending mri, ?further imaging  follow up with neurosurgery, Dr. Odell, within 1 week of discharge, call [phone]  follow up with PMD within 1 week of discharge

## 2022-10-11 NOTE — DISCHARGE NOTE PROVIDER - HOSPITAL COURSE
Patient is a 62 year old male with low back pain and right lower extremity radiculopathy.  Admitted to the neurosurgery service for further management.  Oxycodone used for pain control.  Home medications restarted.  Lower extremity duplex done which was negative for dvt b/l.  Patient was taken down for mri with anesthesia however he was unable to be placed in the mri (small bore).  Physical therapy evaluated patient and recommended outpatient pt upon discharge.  Patient will get mri as outpatient and follow up with Dr. Odell. Patient is a 62 year old male with low back pain and right lower extremity radiculopathy.  Admitted to the neurosurgery service for further management.  Oxycodone used for pain control.  Home medications restarted.  Lower extremity duplex done which was negative for dvt b/l.  Patient was taken down for mri with anesthesia however he was unable to be placed in the mri (small bore).  Physical therapy evaluated patient and recommended outpatient pt upon discharge.  Patient will get mri as outpatient and follow up with Dr. Odell.          I-stop reference #[id number] Patient is a 62 year old male with low back pain and right lower extremity radiculopathy.  Admitted to the neurosurgery service for further management.  Oxycodone used for pain control.  Home medications restarted.  Lower extremity duplex done which was negative for dvt b/l.  Patient was taken down for mri with anesthesia however he was unable to be placed in the mri (small bore).  Physical therapy evaluated patient and recommended outpatient pt upon discharge.  Patient will get mri as outpatient and follow up with Dr. Odell.          I-stop reference #[id number] Patient is a 62 year old male with low back pain and right lower extremity radiculopathy.  Admitted to the neurosurgery service for further management.  Oxycodone used for pain control.  Home medications restarted.  Lower extremity duplex done which was negative for dvt b/l.  Patient was taken down for mri with anesthesia however he was unable to be placed in the mri (small bore).  Physical therapy evaluated patient and recommended outpatient pt upon discharge.  Patient will get mri as outpatient and follow up with Dr. Odell.          I-stop reference #[id number]

## 2022-10-11 NOTE — PROVIDER CONTACT NOTE (OTHER) - ASSESSMENT
aox3. Pt getting OOB and chair multiple times without assistance. Pt stated   "Where am I, where are my siblings. This is worse than a senior care." Reorientation attempted. Pt agitated. Pt placed back into chair with alarm on. aox3. Pt getting OOB and chair multiple times without assistance. Pt stated   "Where am I, where are my siblings. This is worse than a [facility]." Reorientation attempted. Pt agitated. Pt placed back into chair with alarm on.

## 2022-10-11 NOTE — DISCHARGE NOTE NURSING/CASE MANAGEMENT/SOCIAL WORK - NSDCPEFALRISK_GEN_ALL_CORE
For information on Fall & Injury Prevention, visit: https://www.[facility]/news/fall-prevention-protects-and-maintains-health-and-mobility OR  https://www.[facility]/news/fall-prevention-tips-to-avoid-injury OR  https://www.cdc.gov/steadi/patient.html For information on Fall & Injury Prevention, visit: https://www.[facility]/news/fall-prevention-protects-and-maintains-health-and-mobility OR  https://www.[facility]/news/fall-prevention-tips-to-avoid-injury OR  https://www.cdc.gov/steadi/patient.html For information on Fall & Injury Prevention, visit: https://www.[facility]/news/fall-prevention-protects-and-maintains-health-and-mobility OR  https://www.[facility]/news/fall-prevention-tips-to-avoid-injury OR  https://www.cdc.gov/steadi/patient.html

## 2022-10-11 NOTE — PROGRESS NOTE ADULT - SUBJECTIVE AND OBJECTIVE BOX
HPI:  Patient is a 62 year old male with low back pain and right lower extremity radiculopathy.  Admitted to the neurosurgery service for further management.    OVERNIGHT EVENTS:  Patient delirious overnight, resolved this am.  Planned for mri w/ anesthesia today.  Has been out of bed.    Vital Signs Last 24 Hrs  T(C): 36.8 (11 Oct 2022 04:34), Max: 36.8 (10 Oct 2022 12:58)  T(F): 98.3 (11 Oct 2022 04:34), Max: 98.3 (11 Oct 2022 04:34)  HR: 70 (11 Oct 2022 04:34) (60 - 110)  BP: 121/76 (11 Oct 2022 04:34) (116/73 - 140/82)  BP(mean): --  RR: 18 (11 Oct 2022 04:34) (18 - 18)  SpO2: 94% (11 Oct 2022 04:34) (93% - 96%)    Parameters below as of 11 Oct 2022 04:34  Patient On (Oxygen Delivery Method): room air        I&O's Detail    10 Oct 2022 07:01  -  11 Oct 2022 07:00  --------------------------------------------------------  IN:    Oral Fluid: 730 mL    sodium chloride 0.9%: 200 mL  Total IN: 930 mL    OUT:  Total OUT: 0 mL    Total NET: 930 mL        I&O's Summary    10 Oct 2022 07:01  -  11 Oct 2022 07:00  --------------------------------------------------------  IN: 930 mL / OUT: 0 mL / NET: 930 mL        PHYSICAL EXAM:  Neurological: awake, alert, oriented x3, follows commands, speech clear and fluent, moves all extremities x4 w/ 5/5 strength throughout, admits to intermittent numbness/tingling RLE thigh/knee, but sensation present, intact, equal to light touch throughout    Cardiovascular: +s1, s2  Respiratory: clear to auscultation b/l  Gastrointestinal: soft, non-distended, non-tender  Genitourinary: +voiding  Extremities: +dp/pt pulses palpable b/l    TUBES/LINES:  [x] none    DIET:  [x] npo/ivf      LABS:                        12.0   6.69  )-----------( 283      ( 10 Oct 2022 08:56 )             38.6     10-10    142  |  98  |  10  ----------------------------<  104<H>  4.0   |  33<H>  |  0.82    Ca    9.5      10 Oct 2022 08:57    TPro  6.8  /  Alb  3.9  /  TBili  0.3  /  DBili  x   /  AST  17  /  ALT  21  /  AlkPhos  74  10-10    PT/INR - ( 09 Oct 2022 12:37 )   PT: 12.2 sec;   INR: 1.05 ratio         PTT - ( 09 Oct 2022 12:37 )  PTT:29.3 sec          Allergies    No Known Allergies          MEDICATIONS:  Antibiotics:  none    Neuro:  acetaminophen     Tablet .. 650 milliGRAM(s) Oral every 6 hours PRN  ALPRAZolam 1 milliGRAM(s) Oral two times a day PRN  gabapentin 300 milliGRAM(s) Oral three times a day PRN  methocarbamol 500 milliGRAM(s) Oral three times a day PRN  ondansetron Injectable 4 milliGRAM(s) IV Push every 6 hours PRN  oxyCODONE    IR 5 milliGRAM(s) Oral every 4 hours PRN  oxyCODONE    IR 10 milliGRAM(s) Oral every 4 hours PRN  PARoxetine 20 milliGRAM(s) Oral daily    Anticoagulation:  enoxaparin Injectable 30 milliGRAM(s) SubCutaneous every 12 hours    OTHER:  amLODIPine   Tablet 10 milliGRAM(s) Oral daily  atorvastatin 40 milliGRAM(s) Oral at bedtime  hydrochlorothiazide 25 milliGRAM(s) Oral daily  metoprolol succinate  milliGRAM(s) Oral daily  pantoprazole    Tablet 40 milliGRAM(s) Oral before breakfast  polyethylene glycol 3350 17 Gram(s) Oral daily  senna 2 Tablet(s) Oral at bedtime  valsartan 320 milliGRAM(s) Oral daily    IVF:  folic acid 1 milliGRAM(s) Oral daily  sodium chloride 0.9%. 1000 milliLiter(s) IV Continuous <Continuous>    CULTURES:  none    RADIOLOGY & ADDITIONAL TESTS:  lower extremity duplex: negative for dvt b/l  right knee xray: done, final read pending    
HPI:  Patient is a 62 year old male with low back pain and right lower extremity radiculopathy.  Admitted to the neurosurgery service for further management.    OVERNIGHT EVENTS:  No acute events overnight.  Admitted for pain control and mri.  Has been out of bed.  Tolerating diet.    Vital Signs Last 24 Hrs  T(C): 36.4 (10 Oct 2022 04:30), Max: 36.7 (09 Oct 2022 11:10)  T(F): 97.5 (10 Oct 2022 04:30), Max: 98.1 (09 Oct 2022 11:10)  HR: 54 (10 Oct 2022 05:56) (54 - 64)  BP: 122/73 (10 Oct 2022 05:56) (110/73 - 147/85)  BP(mean): --  RR: 18 (10 Oct 2022 04:30) (18 - 18)  SpO2: 96% (10 Oct 2022 04:30) (93% - 96%)    Parameters below as of 10 Oct 2022 04:30  Patient On (Oxygen Delivery Method): room air        I&O's Detail    09 Oct 2022 07:01  -  10 Oct 2022 07:00  --------------------------------------------------------  IN:    Oral Fluid: 360 mL  Total IN: 360 mL    OUT:  Total OUT: 0 mL    Total NET: 360 mL        I&O's Summary    09 Oct 2022 07:01  -  10 Oct 2022 07:00  --------------------------------------------------------  IN: 360 mL / OUT: 0 mL / NET: 360 mL        PHYSICAL EXAM:  Neurological: awake, alert, oriented x3, follows commands, speech clear and fluent, moves all extremities x4 w/ 5/5 strength throughout, admits to intermittent numbness/tingling RLE thigh/knee, but sensation present, intact, equal to light touch throughout    Cardiovascular: +s1, s2  Respiratory: clear to auscultation b/l  Gastrointestinal: soft, non-distended, non-tender  Genitourinary: +voiding  Extremities: +dp/pt pulses palpable b/l    TUBES/LINES:  [x] none    DIET:  [x] regular      LABS:                        12.0   6.69  )-----------( 283      ( 10 Oct 2022 08:56 )             38.6     10-09    138  |  96  |  11  ----------------------------<  96  3.5   |  29  |  0.79    Ca    9.4      09 Oct 2022 12:37    TPro  7.3  /  Alb  4.0  /  TBili  0.3  /  DBili  x   /  AST  18  /  ALT  20  /  AlkPhos  78  10-09    PT/INR - ( 09 Oct 2022 12:37 )   PT: 12.2 sec;   INR: 1.05 ratio         PTT - ( 09 Oct 2022 12:37 )  PTT:29.3 sec          Allergies    No Known Allergies          MEDICATIONS:  Antibiotics:  none    Neuro:  acetaminophen     Tablet .. 650 milliGRAM(s) Oral every 6 hours PRN  ALPRAZolam 1 milliGRAM(s) Oral two times a day PRN  gabapentin 300 milliGRAM(s) Oral three times a day PRN  methocarbamol 500 milliGRAM(s) Oral three times a day PRN  ondansetron Injectable 4 milliGRAM(s) IV Push every 6 hours PRN  oxyCODONE    IR 5 milliGRAM(s) Oral every 4 hours PRN  oxyCODONE    IR 10 milliGRAM(s) Oral every 4 hours PRN  PARoxetine 20 milliGRAM(s) Oral daily    Anticoagulation:  enoxaparin Injectable 30 milliGRAM(s) SubCutaneous every 12 hours    OTHER:  amLODIPine   Tablet 10 milliGRAM(s) Oral daily  atorvastatin 40 milliGRAM(s) Oral at bedtime  hydrochlorothiazide 25 milliGRAM(s) Oral daily  metoprolol succinate  milliGRAM(s) Oral daily  pantoprazole    Tablet 40 milliGRAM(s) Oral before breakfast  polyethylene glycol 3350 17 Gram(s) Oral daily  senna 2 Tablet(s) Oral at bedtime  valsartan 320 milliGRAM(s) Oral daily    IVF:  folic acid 1 milliGRAM(s) Oral daily    CULTURES:  none    RADIOLOGY & ADDITIONAL TESTS:  no new imaging

## 2022-10-11 NOTE — DISCHARGE NOTE PROVIDER - CARE PROVIDERS DIRECT ADDRESSES
,[email or web address]

## 2022-10-20 PROCEDURE — 80053 COMPREHEN METABOLIC PANEL: CPT

## 2022-10-20 PROCEDURE — 86900 BLOOD TYPING SEROLOGIC ABO: CPT

## 2022-10-20 PROCEDURE — 85610 PROTHROMBIN TIME: CPT

## 2022-10-20 PROCEDURE — 86901 BLOOD TYPING SEROLOGIC RH(D): CPT

## 2022-10-20 PROCEDURE — 86803 HEPATITIS C AB TEST: CPT

## 2022-10-20 PROCEDURE — 85027 COMPLETE CBC AUTOMATED: CPT

## 2022-10-20 PROCEDURE — 85025 COMPLETE CBC W/AUTO DIFF WBC: CPT

## 2022-10-20 PROCEDURE — 99285 EMERGENCY DEPT VISIT HI MDM: CPT

## 2022-10-20 PROCEDURE — 85730 THROMBOPLASTIN TIME PARTIAL: CPT

## 2022-10-20 PROCEDURE — 93970 EXTREMITY STUDY: CPT

## 2022-10-20 PROCEDURE — U0005: CPT

## 2022-10-20 PROCEDURE — 86140 C-REACTIVE PROTEIN: CPT

## 2022-10-20 PROCEDURE — 86850 RBC ANTIBODY SCREEN: CPT

## 2022-10-20 PROCEDURE — 36415 COLL VENOUS BLD VENIPUNCTURE: CPT

## 2022-10-20 PROCEDURE — 73564 X-RAY EXAM KNEE 4 OR MORE: CPT

## 2022-10-20 PROCEDURE — U0003: CPT

## 2022-10-20 PROCEDURE — 97161 PT EVAL LOW COMPLEX 20 MIN: CPT

## 2022-10-20 PROCEDURE — 85652 RBC SED RATE AUTOMATED: CPT

## 2022-10-20 PROCEDURE — 93971 EXTREMITY STUDY: CPT

## 2022-10-26 ENCOUNTER — APPOINTMENT (OUTPATIENT)
Dept: SPINE | Facility: CLINIC | Age: 62
End: 2022-10-26

## 2022-10-26 VITALS
OXYGEN SATURATION: 95 % | DIASTOLIC BLOOD PRESSURE: 71 MMHG | HEART RATE: 57 BPM | BODY MASS INDEX: 41.23 KG/M2 | HEIGHT: 70 IN | WEIGHT: 288 LBS | SYSTOLIC BLOOD PRESSURE: 108 MMHG

## 2022-10-26 DIAGNOSIS — F12.91 CANNABIS USE, UNSPECIFIED, IN REMISSION: ICD-10-CM

## 2022-10-26 DIAGNOSIS — Z86.39 PERSONAL HISTORY OF OTHER ENDOCRINE, NUTRITIONAL AND METABOLIC DISEASE: ICD-10-CM

## 2022-10-26 DIAGNOSIS — Z72.0 TOBACCO USE: ICD-10-CM

## 2022-10-26 DIAGNOSIS — M48.062 SPINAL STENOSIS, LUMBAR REGION WITH NEUROGENIC CLAUDICATION: ICD-10-CM

## 2022-10-26 DIAGNOSIS — Z86.79 PERSONAL HISTORY OF OTHER DISEASES OF THE CIRCULATORY SYSTEM: ICD-10-CM

## 2022-10-26 DIAGNOSIS — Z82.49 FAMILY HISTORY OF ISCHEMIC HEART DISEASE AND OTHER DISEASES OF THE CIRCULATORY SYSTEM: ICD-10-CM

## 2022-10-26 PROCEDURE — 99212 OFFICE O/P EST SF 10 MIN: CPT

## 2023-12-04 RX ORDER — METOPROLOL TARTRATE 50 MG
1 TABLET ORAL
Qty: 0 | Refills: 0 | DISCHARGE

## 2023-12-04 RX ORDER — ALPRAZOLAM 0.25 MG
1 TABLET ORAL
Qty: 0 | Refills: 0 | DISCHARGE

## 2023-12-04 RX ORDER — FOLIC ACID 0.8 MG
1 TABLET ORAL
Qty: 0 | Refills: 0 | DISCHARGE

## 2023-12-04 RX ORDER — ROSUVASTATIN CALCIUM 5 MG/1
1 TABLET ORAL
Qty: 0 | Refills: 0 | DISCHARGE

## 2023-12-04 RX ORDER — HYDROCHLOROTHIAZIDE 25 MG
1 TABLET ORAL
Qty: 0 | Refills: 0 | DISCHARGE

## 2023-12-04 RX ORDER — LANSOPRAZOLE 15 MG/1
1 CAPSULE, DELAYED RELEASE ORAL
Qty: 0 | Refills: 0 | DISCHARGE

## 2023-12-04 RX ORDER — AMLODIPINE AND VALSARTAN 5; 320 MG/1; MG/1
1 TABLET, FILM COATED ORAL
Qty: 0 | Refills: 0 | DISCHARGE

## 2024-11-21 PROBLEM — I10 ESSENTIAL (PRIMARY) HYPERTENSION: Chronic | Status: ACTIVE | Noted: 2022-10-09

## 2024-11-21 PROBLEM — E78.5 HYPERLIPIDEMIA, UNSPECIFIED: Chronic | Status: ACTIVE | Noted: 2022-10-09

## 2024-11-21 PROBLEM — M06.9 RHEUMATOID ARTHRITIS, UNSPECIFIED: Chronic | Status: ACTIVE | Noted: 2022-10-09

## 2024-12-06 ENCOUNTER — TRANSCRIPTION ENCOUNTER (OUTPATIENT)
Age: 64
End: 2024-12-06

## 2024-12-06 ENCOUNTER — OUTPATIENT (OUTPATIENT)
Dept: OUTPATIENT SERVICES | Facility: HOSPITAL | Age: 64
LOS: 1 days | Discharge: HOME | End: 2024-12-06
Payer: MEDICARE

## 2024-12-06 VITALS
DIASTOLIC BLOOD PRESSURE: 77 MMHG | TEMPERATURE: 98 F | OXYGEN SATURATION: 97 % | HEIGHT: 70 IN | HEART RATE: 62 BPM | WEIGHT: 270.07 LBS | RESPIRATION RATE: 16 BRPM | SYSTOLIC BLOOD PRESSURE: 109 MMHG

## 2024-12-06 VITALS
OXYGEN SATURATION: 100 % | RESPIRATION RATE: 16 BRPM | DIASTOLIC BLOOD PRESSURE: 77 MMHG | SYSTOLIC BLOOD PRESSURE: 105 MMHG | HEART RATE: 62 BPM

## 2024-12-06 DIAGNOSIS — Z98.890 OTHER SPECIFIED POSTPROCEDURAL STATES: Chronic | ICD-10-CM

## 2024-12-06 DIAGNOSIS — D38.0 NEOPLASM OF UNCERTAIN BEHAVIOR OF LARYNX: ICD-10-CM

## 2024-12-06 PROCEDURE — 88305 TISSUE EXAM BY PATHOLOGIST: CPT | Mod: 26

## 2024-12-06 RX ORDER — 0.9 % SODIUM CHLORIDE 0.9 %
1000 INTRAVENOUS SOLUTION INTRAVENOUS
Refills: 0 | Status: ACTIVE | OUTPATIENT
Start: 2024-12-06 | End: 2025-11-04

## 2024-12-06 RX ORDER — ONDANSETRON HYDROCHLORIDE 4 MG/1
4 TABLET, FILM COATED ORAL ONCE
Refills: 0 | Status: ACTIVE | OUTPATIENT
Start: 2024-12-06 | End: 2025-11-04

## 2024-12-06 RX ORDER — ACETAMINOPHEN 500MG 500 MG/1
1000 TABLET, COATED ORAL ONCE
Refills: 0 | Status: ACTIVE | OUTPATIENT
Start: 2024-12-06 | End: 2025-11-04

## 2024-12-06 RX ORDER — BENZOCAINE, MENTHOL 15; 3.6 MG/1; MG/1
1 LOZENGE ORAL
Refills: 0 | Status: ACTIVE | OUTPATIENT
Start: 2024-12-06 | End: 2025-11-04

## 2024-12-06 NOTE — H&P ADULT - NSHPPHYSICALEXAM_GEN_ALL_CORE
Head; NCAT  Face: full motion  Eyes: PERRL  Nose: clear anteriorly  Mouth: dentures  Voice: hoarse voice  Neck: no scars  Chest: normal respiratory rate  Abdomen: supple  Extremities: warm

## 2024-12-06 NOTE — ASU DISCHARGE PLAN (ADULT/PEDIATRIC) - CALL YOUR DOCTOR IF YOU HAVE ANY OF THE FOLLOWING:
Bleeding that does not stop/Swelling that gets worse/Pain not relieved by Medications/Fever greater than (need to indicate Fahrenheit or Celsius)/Wound/Surgical Site with redness, or foul smelling discharge or pus/Unable to urinate/Excessive diarrhea/Inability to tolerate liquids or foods/Increased irritability or sluggishness

## 2024-12-06 NOTE — H&P ADULT - HISTORY OF PRESENT ILLNESS
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  Rare EtOH

## 2024-12-06 NOTE — H&P ADULT - NSHPREVIEWOFSYSTEMS_GEN_ALL_CORE
Hypertension Hypertension  Hypercholesterolemia  Gastroesophageal reflux disease  Anxiety  Ankle fracture and s/p repair and right sided leg reconstruction.

## 2024-12-06 NOTE — ASU DISCHARGE PLAN (ADULT/PEDIATRIC) - FINANCIAL ASSISTANCE
[facility] provides services at a reduced cost to those who are determined to be eligible through [facility]’s financial assistance program. Information regarding [facility]’s financial assistance program can be found by going to [email or web address] or by calling [phone].

## 2024-12-06 NOTE — ASU DISCHARGE PLAN (ADULT/PEDIATRIC) - CARE PROVIDER_API CALL
Raz Terrell  Otolaryngology  [id number]  [place] [address]  Phone: [phone]  Fax: [phone]  Established Patient  Follow Up Time: 1 week

## 2024-12-06 NOTE — ASU DISCHARGE PLAN (ADULT/PEDIATRIC) - NS MD DC FALL RISK RISK
For information on Fall & Injury Prevention, visit: [email or web address]://www.[facility]/news/fall-prevention-protects-and-maintains-health-and-mobility OR  https://www.[facility]/news/fall-prevention-tips-to-avoid-injury OR  https://www.cdc.gov/steadi/patient.html

## 2024-12-06 NOTE — ASU PREOP CHECKLIST - BLOOD AVAILABLE
----- Message from aCro Palmer sent at 6/16/2017  6:32 PM CDT -----  Regarding: Appointment Request ()  Contact: [phone]  Appointment Request From: Caro Palmer    With Provider: Gregory G. Schoen, MD [-Primary Care Physician-]    Preferred Date Range: From 6/16/2017 To 6/16/2017    Preferred Times: Monday Afternoon    Reason: To address the following health maintenance concerns.  Hepatitis C Screening    Comments:  I have a appointment on Monday, June 19th @ 4:30 with Dr. Schoen. Could I do it at the same time?   n/a

## 2024-12-09 LAB
-  CLINDAMYCIN: SIGNIFICANT CHANGE UP
-  ERYTHROMYCIN: SIGNIFICANT CHANGE UP
-  LEVOFLOXACIN: SIGNIFICANT CHANGE UP
-  TETRACYCLINE: SIGNIFICANT CHANGE UP
-  TRIMETHOPRIM/SULFAMETHOXAZOLE: SIGNIFICANT CHANGE UP
-  VANCOMYCIN: SIGNIFICANT CHANGE UP
CULTURE RESULTS: ABNORMAL
METHOD TYPE: SIGNIFICANT CHANGE UP
ORGANISM # SPEC MICROSCOPIC CNT: ABNORMAL
ORGANISM # SPEC MICROSCOPIC CNT: ABNORMAL
SPECIMEN SOURCE: SIGNIFICANT CHANGE UP

## 2024-12-11 LAB — SURGICAL PATHOLOGY STUDY: SIGNIFICANT CHANGE UP

## 2025-02-01 NOTE — ED ADULT TRIAGE NOTE - WEIGHT IN KG
Pt is poor historian, claims he lives with family in private home, with 5 steps to enter, independent in ADLs 130.6

## 2025-09-18 ENCOUNTER — APPOINTMENT (OUTPATIENT)
Dept: RADIATION ONCOLOGY | Facility: CLINIC | Age: 65
End: 2025-09-18

## 2025-09-18 VITALS — BODY MASS INDEX: 38.17 KG/M2 | WEIGHT: 266 LBS

## 2025-09-18 VITALS
RESPIRATION RATE: 16 BRPM | HEIGHT: 70 IN | OXYGEN SATURATION: 98 % | DIASTOLIC BLOOD PRESSURE: 78 MMHG | HEART RATE: 68 BPM | SYSTOLIC BLOOD PRESSURE: 121 MMHG | BODY MASS INDEX: 38.17 KG/M2

## 2025-09-18 RX ORDER — LANSOPRAZOLE 30 MG/1
30 CAPSULE, DELAYED RELEASE ORAL
Refills: 0 | Status: ACTIVE | COMMUNITY
Start: 2025-09-18

## 2025-09-18 RX ORDER — METOPROLOL TARTRATE 100 MG/1
100 TABLET ORAL
Refills: 0 | Status: ACTIVE | COMMUNITY
Start: 2025-09-18

## 2025-09-18 RX ORDER — ROSUVASTATIN CALCIUM 10 MG/1
10 TABLET, FILM COATED ORAL
Refills: 0 | Status: ACTIVE | COMMUNITY
Start: 2025-09-18

## 2025-09-18 RX ORDER — PAROXETINE HYDROCHLORIDE 20 MG/1
20 TABLET, FILM COATED ORAL
Refills: 0 | Status: ACTIVE | COMMUNITY
Start: 2025-09-18

## 2025-09-18 RX ORDER — SEMAGLUTIDE 0.68 MG/ML
2 INJECTION, SOLUTION SUBCUTANEOUS
Refills: 0 | Status: ACTIVE | COMMUNITY
Start: 2025-09-18

## 2025-09-26 PROBLEM — D02.0: Status: ACTIVE | Noted: 2025-09-18

## (undated) DEVICE — TUBING SUCTION NONCONDUCTIVE 6MM X 12FT

## (undated) DEVICE — SUT CHROMIC 4-0 18" S-4

## (undated) DEVICE — Device

## (undated) DEVICE — POSITIONER FOAM EGG CRATE ULNAR 2PCS (PINK)

## (undated) DEVICE — WARMING BLANKET LOWER ADULT

## (undated) DEVICE — GLV 8 PROTEXIS (WHITE)

## (undated) DEVICE — BLADE MEDTRONIC ENT SKIMMER ROTATABLE 15 DEGREE 2.9MM X 22CM

## (undated) DEVICE — LIJ-SATALOFF ANT COMMISSURE LARYNGOSCOPE: Type: DURABLE MEDICAL EQUIPMENT

## (undated) DEVICE — LABELS BLANK W PEN

## (undated) DEVICE — DRAPE 3/4 SHEET 52X76"

## (undated) DEVICE — VENODYNE/SCD SLEEVE CALF MEDIUM

## (undated) DEVICE — DRSG CURITY GAUZE SPONGE 4 X 4" 12-PLY

## (undated) DEVICE — SOL ANTI FOG (FRED)

## (undated) DEVICE — SOL IRR POUR H2O 500ML